# Patient Record
Sex: FEMALE | Race: WHITE | Employment: OTHER | URBAN - METROPOLITAN AREA
[De-identification: names, ages, dates, MRNs, and addresses within clinical notes are randomized per-mention and may not be internally consistent; named-entity substitution may affect disease eponyms.]

---

## 2017-01-31 ENCOUNTER — OFFICE VISIT (OUTPATIENT)
Dept: GYNECOLOGY | Age: 62
End: 2017-01-31

## 2017-01-31 VITALS
DIASTOLIC BLOOD PRESSURE: 74 MMHG | WEIGHT: 185.8 LBS | HEIGHT: 62 IN | BODY MASS INDEX: 34.19 KG/M2 | SYSTOLIC BLOOD PRESSURE: 104 MMHG | HEART RATE: 70 BPM

## 2017-01-31 DIAGNOSIS — Z01.419 ENCOUNTER FOR GYNECOLOGICAL EXAMINATION: Primary | ICD-10-CM

## 2017-01-31 DIAGNOSIS — Z85.43 HISTORY OF OVARIAN CANCER: ICD-10-CM

## 2017-01-31 RX ORDER — METOPROLOL SUCCINATE 25 MG/1
TABLET, EXTENDED RELEASE ORAL DAILY
COMMUNITY

## 2017-01-31 NOTE — PROGRESS NOTES
[address] [facility] [address], [address] [facility]  [phone]  MD Araceli Corcoran MD    Patient ID:  Shea Apgar  [id number]  1955/61 y.o. Visit date: 2017    INTERVAL HISTORY: Shea Apgar is a  female with a history of ovarian cancer. Last Cytology: 2014   Negative  Imaging history: Mammogram current  Chemotherapy history: None    She is being seen today for routine followup. Active, no restrictions. Negative  and GI review. Negative cardiopulmonary review. Patient denies any abnormal bleeding or vaginal discharge. Weight stable. Has stopped using Estrace cream  Continued weight gain    OB/GYN ROS: Denies, dysuria, hematuria, urinary incontinence, vaginal discharge, abnormal vaginal bleeding, pelvic pain    Past Medical History   Diagnosis Date    Cancer [facility])      ovarian cancer       Past Surgical History   Procedure Laterality Date    Hx cholecystectomy      Hx gyn       hysterectomy    Endoscopy, colon, diagnostic       2012       Social History     Social History    Marital status:      Spouse name: N/A    Number of children: N/A    Years of education: N/A     Occupational History    Not on file. Social History Main Topics    Smoking status: Never Smoker    Smokeless tobacco: Not on file    Alcohol use No    Drug use: No    Sexual activity: Not on file     Other Topics Concern    Not on file     Social History Narrative       History reviewed. No pertinent family history. No current outpatient prescriptions on file prior to visit. No current facility-administered medications on file prior to visit.         Allergies   Allergen Reactions    Darvocet A500 [Propoxyphene N-Acetaminophen] Unknown (comments)    Demerol [Meperidine] Unknown (comments)       ROS:  Negative except periodic pelvic cramping attributed to diverticular disease (rare)    OBJECTIVE:  PHYSICAL EXAM  VITAL SIGNS: Visit Vitals    /74 (BP 1 Location: Left arm, BP Patient Position: Sitting)    Pulse 70    Ht 5' 2\" (1.575 m)    Wt 185 lb 12.8 oz (84.3 kg)    BMI 33.98 kg/m2      GENERAL TIERRA: in no apparent distress, well developed    HEENT: within normal limits, no JVD   RESPIRATORY: lungs clear to auscultation,    CARDIOVASC: Regular rate and rhythm   GASTROINT: soft, non-tender, without masses or organomegaly   MUSCULOSKEL: no joint tenderness, deformity or swelling   INTEGUMENT:    EXTREMITIES: extremities normal, atraumatic, no cyanosis or edema   PELVIC: External genitalia: normal general appearance, BUS negative. Vaginal: normal mucosa without prolapse or lesions, normal without tenderness, induration or masses and normal rugae. Cytology taken  Adnexa: normal bimanual exam and non palpable, PSW clear, limited   RECTAL: rectal exam not indicated   ILANA SURVEY: Cervical, supraclavicular, and axillary nodes normal.   NEURO: Grossly normal     DATE REVIEW as available:  No results found for: WBC  No results found for: NA    IMPRESSION AND PLAN:    Jose Alberto Smart has a working diagnosis of ovarian cancer, KYLE  Normal annual examination    Cytology taken without friability  Return annually pending normal cytology or PRN.       Todd Remy MD  1/31/2017/12:43 PM

## 2017-02-06 LAB
CYTOLOGIST CVX/VAG CYTO: NORMAL
DX ICD CODE: NORMAL
Lab: NORMAL
Lab: NORMAL
OTHER STN SPEC: NORMAL
PATH REPORT.FINAL DX SPEC: NORMAL
STAT OF ADQ CVX/VAG CYTO-IMP: NORMAL

## 2017-02-16 NOTE — PROGRESS NOTES
Patient:   Malaika Khoury  SSN: [id number]  : 1955    Date:    2017    Ms. Trygve Cabot PKGGIYBY'A cytology/Pap smear has been interpreted as within normal limts. I would ask that subsequent Pap smears be performed at the interval discussed at the last office visit.     If there are any questions please do not hesitate to contact our offices ([phone]    Tyson Buck MD

## 2018-02-06 ENCOUNTER — OFFICE VISIT (OUTPATIENT)
Dept: GYNECOLOGY | Age: 63
End: 2018-02-06

## 2018-02-06 VITALS
SYSTOLIC BLOOD PRESSURE: 93 MMHG | HEART RATE: 71 BPM | DIASTOLIC BLOOD PRESSURE: 55 MMHG | WEIGHT: 188.6 LBS | BODY MASS INDEX: 34.71 KG/M2 | HEIGHT: 62 IN

## 2018-02-06 DIAGNOSIS — Z01.419 ENCOUNTER FOR GYNECOLOGICAL EXAMINATION: ICD-10-CM

## 2018-02-06 DIAGNOSIS — Z85.43 HISTORY OF OVARIAN CANCER: Primary | ICD-10-CM

## 2018-02-06 NOTE — PROGRESS NOTES
One year check up, pt reports no abnormal spotting or bleeding, pt states she has no questions or concerns for today's visit

## 2018-02-06 NOTE — PROGRESS NOTES
[address] [place] [address] [address], [address]  [phone]  MD Akila Payton MD    Patient ID:  Almaz Ye  [id number]  1955/62 y.o. Visit date: 2/6/2018    INTERVAL HISTORY: Almaz Ye is a  female with a history of ovarian cancer. Last Cytology: 1/2017,  9/19/2014   Negative  Imaging history: Mammogram current  Chemotherapy history: None    2/6/2018:  She is being seen today for routine followup. Active, no restrictions. Negative  and GI review. Negative cardiopulmonary review. Patient denies any abnormal bleeding or vaginal discharge. Weight stable. Has stopped using Estrace cream  Continued weight gain    OB/GYN ROS: Denies, dysuria, hematuria, urinary incontinence, vaginal discharge, abnormal vaginal bleeding, pelvic pain    Past Medical History:   Diagnosis Date    Cancer [facility])     ovarian cancer       Past Surgical History:   Procedure Laterality Date    ENDOSCOPY, COLON, DIAGNOSTIC      march 2012    HX CHOLECYSTECTOMY      HX GYN      hysterectomy       Social History     Social History    Marital status:      Spouse name: N/A    Number of children: N/A    Years of education: N/A     Occupational History    Not on file. Social History Main Topics    Smoking status: Never Smoker    Smokeless tobacco: Never Used    Alcohol use No    Drug use: No    Sexual activity: Not on file     Other Topics Concern    Not on file     Social History Narrative       History reviewed. No pertinent family history. Current Outpatient Prescriptions on File Prior to Visit   Medication Sig Dispense Refill    metoprolol succinate (TOPROL-XL) 25 mg XL tablet Take  by mouth daily. No current facility-administered medications on file prior to visit.         Allergies   Allergen Reactions    Darvocet A500 [Propoxyphene N-Acetaminophen] Unknown (comments)    Demerol [Meperidine] Unknown (comments)       ROS:  Negative except periodic pelvic cramping attributed to diverticular disease (rare)    OBJECTIVE:  PHYSICAL EXAM  VITAL SIGNS: Visit Vitals    BP 93/55 (BP 1 Location: Left arm, BP Patient Position: Sitting)    Pulse 71    Ht 5' 2\" (1.575 m)    Wt 188 lb 9.6 oz (85.5 kg)    BMI 34.5 kg/m2      GENERAL TIERRA: in no apparent distress, well developed    HEENT: within normal limits, no JVD   RESPIRATORY: lungs clear to auscultation,    CARDIOVASC: Regular rate and rhythm   GASTROINT: soft, non-tender, without masses or organomegaly   MUSCULOSKEL: no joint tenderness, deformity or swelling   INTEGUMENT:    EXTREMITIES: extremities normal, atraumatic, no cyanosis or edema   PELVIC: External genitalia: normal general appearance, BUS negative. Vaginal: normal mucosa without prolapse or lesions, normal without tenderness, induration or masses and normal rugae. Cytology taken  Adnexa: normal bimanual exam and non palpable, PSW clear, limited   RECTAL: rectal exam not indicated   ILANA SURVEY: Cervical, supraclavicular, and axillary nodes normal.   NEURO: Grossly normal     DATE REVIEW as available:  No results found for: WBC  No results found for: NA    IMPRESSION AND PLAN:    Fran Wynn has a working diagnosis of ovarian cancer, KYLE  Normal annual examination    Cytology taken without friability  Return annually pending normal cytology or PRN.       Ramiro Reece MD  2/6/2018/12:43 PM

## 2018-02-08 LAB
CYTOLOGIST CVX/VAG CYTO: NORMAL
DX ICD CODE: NORMAL
Lab: NORMAL
OTHER STN SPEC: NORMAL
PATH REPORT.FINAL DX SPEC: NORMAL
STAT OF ADQ CVX/VAG CYTO-IMP: NORMAL

## 2018-02-18 NOTE — PROGRESS NOTES
Patient:   Eitan Weaver  SSN: [id number]  : 1955    Date:    2018    Ms. Kamilla Salinas DQJKSERB'T cytology/Pap smear has been interpreted as within normal limts. I would ask that subsequent Pap smears be performed at the interval discussed at the last office visit.     If there are any questions please do not hesitate to contact our offices [phone] Wayne Cruz MD

## 2019-02-14 ENCOUNTER — OFFICE VISIT (OUTPATIENT)
Dept: GYNECOLOGY | Age: 64
End: 2019-02-14

## 2019-02-14 VITALS
HEART RATE: 66 BPM | BODY MASS INDEX: 34.96 KG/M2 | SYSTOLIC BLOOD PRESSURE: 139 MMHG | WEIGHT: 190 LBS | DIASTOLIC BLOOD PRESSURE: 74 MMHG | HEIGHT: 62 IN

## 2019-02-14 DIAGNOSIS — Z85.43 HISTORY OF OVARIAN CANCER: Primary | ICD-10-CM

## 2019-02-14 NOTE — PROGRESS NOTES
[address] [facility] [address], [address]   [phone]  F [phone]    Office Note  Patient ID:  Name:  Landon Francois  MRN:  [id number]  :  1955/63 y.o. Date:  2019      HISTORY OF PRESENT ILLNESS:  Landon Francois is a 58 y.o.  postmenopausal female with a history of ovarian cancer. She is a former patient of Dr. Emma Pace and was last seen by him in 2018. She was diagnosed in . She was stage I and did not require any chemotherapy after surgery. She presents today for surveillance. She is doing well and is without complaints. She denies any pelvic or abdominal pain or changes in her bowel or bladder habits. ROS:   and GI review:  Negative  Cardiopulmonary review:  Negative   Musculoskeletal:  Negative    A comprehensive review of systems was negative except for that written in the History of Present Illness. , 10 point ROS        Problem List:  Patient Active Problem List    Diagnosis Date Noted    History of ovarian cancer 2013    Obesity 2013     PMH:  Past Medical History:   Diagnosis Date    Cancer ([facility] [address]     ovarian cancer      PSH:  Past Surgical History:   Procedure Laterality Date    ENDOSCOPY, COLON, DIAGNOSTIC      2012    HX CHOLECYSTECTOMY      HX GYN      hysterectomy      Social History:  Social History     Tobacco Use    Smoking status: Never Smoker    Smokeless tobacco: Never Used   Substance Use Topics    Alcohol use: No      Family History:  History reviewed. No pertinent family history. Medications: (reviewed)  Current Outpatient Medications   Medication Sig    metoprolol succinate (TOPROL-XL) 25 mg XL tablet Take  by mouth daily. No current facility-administered medications for this visit.       Allergies: (reviewed)  Allergies   Allergen Reactions    Darvocet A500 [Propoxyphene N-Acetaminophen] Unknown (comments)    Demerol [Meperidine] Unknown (comments) OBJECTIVE:    Physical Exam:  VITAL SIGNS: Vitals:    02/14/19 1012   Height: 5' 2.01\" (1.575 m)     Body mass index is 34.49 kg/m². GENERAL TIERRA: Conversant, alert, oriented. No acute distress. HEENT: HEENT. No thyroid enlargement. No JVD. Neck: Supple without restrictions. RESPIRATORY: Clear to auscultation and percussion to the bases. No CVAT. CARDIOVASC: RRR without murmur/rub. GASTROINT: soft, non-tender, without masses or organomegaly   MUSCULOSKEL: no joint tenderness, deformity or swelling   EXTREMITIES: extremities normal, atraumatic, no cyanosis or edema   PELVIC: External genitalia: normal general appearance  Urinary system: urethral meatus normal  Vaginal: normal without tenderness, induration or masses  Cervix: absent  Adnexa: removed surgically  Uterus: absent   RECTAL: Deferred   ILANA SURVEY: No suspicious lymphadenopathy or edema noted. NEURO: Grossly intact. No acute deficit. IMPRESSION/PLAN:  Jimmy Serrano is a 58 y.o. female with a diagnosis of ovarian cancer. She has no evidence of disease at this time. We will have her back in 1-2 years for follow-up. I encouraged her to keep up to date on her MMG and colonoscopy.         Signed By: Claudeen Lute, MD     2/14/2019/10:13 AM

## 2022-03-16 ENCOUNTER — TRANSCRIBE ORDER (OUTPATIENT)
Dept: SCHEDULING | Age: 67
End: 2022-03-16

## 2022-03-16 DIAGNOSIS — R91.1 COIN LESION: Primary | ICD-10-CM

## 2022-08-16 DIAGNOSIS — R91.1 COIN LESION: ICD-10-CM

## 2022-08-17 ENCOUNTER — HOSPITAL ENCOUNTER (OUTPATIENT)
Dept: CT IMAGING | Age: 67
Discharge: HOME | End: 2022-08-17
Attending: INTERNAL MEDICINE
Payer: MEDICARE

## 2022-08-17 PROCEDURE — 71250 CT THORAX DX C-: CPT

## 2025-01-21 ENCOUNTER — OFFICE VISIT (OUTPATIENT)
Age: 70
End: 2025-01-21
Payer: MEDICARE

## 2025-01-21 VITALS
HEIGHT: 61 IN | BODY MASS INDEX: 25.57 KG/M2 | OXYGEN SATURATION: 99 % | HEART RATE: 79 BPM | WEIGHT: 135.4 LBS | SYSTOLIC BLOOD PRESSURE: 126 MMHG | DIASTOLIC BLOOD PRESSURE: 60 MMHG | TEMPERATURE: 97.5 F | RESPIRATION RATE: 18 BRPM

## 2025-01-21 DIAGNOSIS — G25.0 BENIGN ESSENTIAL TREMOR SYNDROME: Primary | ICD-10-CM

## 2025-01-21 DIAGNOSIS — G25.3 MYOCLONIC JERKING: ICD-10-CM

## 2025-01-21 DIAGNOSIS — R41.3 MEMORY LOSS: ICD-10-CM

## 2025-01-21 DIAGNOSIS — E53.8 B12 DEFICIENCY: ICD-10-CM

## 2025-01-21 PROCEDURE — 1159F MED LIST DOCD IN RCRD: CPT | Performed by: PSYCHIATRY & NEUROLOGY

## 2025-01-21 PROCEDURE — 99205 OFFICE O/P NEW HI 60 MIN: CPT | Performed by: PSYCHIATRY & NEUROLOGY

## 2025-01-21 PROCEDURE — 3017F COLORECTAL CA SCREEN DOC REV: CPT | Performed by: PSYCHIATRY & NEUROLOGY

## 2025-01-21 PROCEDURE — G8427 DOCREV CUR MEDS BY ELIG CLIN: HCPCS | Performed by: PSYCHIATRY & NEUROLOGY

## 2025-01-21 PROCEDURE — 1160F RVW MEDS BY RX/DR IN RCRD: CPT | Performed by: PSYCHIATRY & NEUROLOGY

## 2025-01-21 PROCEDURE — 1126F AMNT PAIN NOTED NONE PRSNT: CPT | Performed by: PSYCHIATRY & NEUROLOGY

## 2025-01-21 PROCEDURE — G8400 PT W/DXA NO RESULTS DOC: HCPCS | Performed by: PSYCHIATRY & NEUROLOGY

## 2025-01-21 PROCEDURE — 1090F PRES/ABSN URINE INCON ASSESS: CPT | Performed by: PSYCHIATRY & NEUROLOGY

## 2025-01-21 PROCEDURE — 1036F TOBACCO NON-USER: CPT | Performed by: PSYCHIATRY & NEUROLOGY

## 2025-01-21 PROCEDURE — G8419 CALC BMI OUT NRM PARAM NOF/U: HCPCS | Performed by: PSYCHIATRY & NEUROLOGY

## 2025-01-21 PROCEDURE — 1123F ACP DISCUSS/DSCN MKR DOCD: CPT | Performed by: PSYCHIATRY & NEUROLOGY

## 2025-01-21 RX ORDER — FLAXSEED OIL 1000 MG
CAPSULE ORAL
COMMUNITY

## 2025-01-21 ASSESSMENT — PATIENT HEALTH QUESTIONNAIRE - PHQ9
SUM OF ALL RESPONSES TO PHQ QUESTIONS 1-9: 0
SUM OF ALL RESPONSES TO PHQ QUESTIONS 1-9: 0
1. LITTLE INTEREST OR PLEASURE IN DOING THINGS: NOT AT ALL
SUM OF ALL RESPONSES TO PHQ QUESTIONS 1-9: 0
SUM OF ALL RESPONSES TO PHQ QUESTIONS 1-9: 0
SUM OF ALL RESPONSES TO PHQ9 QUESTIONS 1 & 2: 0
2. FEELING DOWN, DEPRESSED OR HOPELESS: NOT AT ALL

## 2025-01-21 NOTE — PROGRESS NOTES
Consult  REFERRED BY:  No primary care provider on file.    CHIEF COMPLAINT: Patient seen on urgent work in basis as a new patient for new problem for multiple neurologic complaints, the first being that she had a progressive tremor that began in her right hand and now has spread to her left hand and is worse when she tries to do things like cutting or working in the kitchen, and also complaining of memory loss, more recent memory, that is more bothersome to her, and also having an episode when she was half asleep and had electric shocklike sensations in the right side of her head that occurred 4 times and then she woke up, and lastly she has myoclonic jerks when she turns quickly was quickly and is concerned about that.      Subjective:     Shasha Billings is a 69 y.o. right-handed  female we are seeing at the request of her PCP for new problem of Patient seen on urgent work in basis as a new patient for new problem for multiple neurologic complaints, the first being that she had a progressive tremor that began in her right hand and now has spread to her left hand and is worse when she tries to do things like cutting or working in the kitchen, and also complaining of memory loss, more recent memory, that is more bothersome to her, and also having an episode when she was half asleep and had electric shocklike sensations in the right side of her head that occurred 4 times and then she woke up, and lastly she has myoclonic jerks when she turns quickly was quickly and is concerned about that.  Patient gives a history that her mother had dementia, and her father had ALS, but she has no other family history of memory loss.  Her  had noticed some problem but other people have not complained much about it.  The patient is concerned about it because of her mother's history.  The patient was taking Inderal till May of last year and went off the medication because she just does not like taking medicines  Her

## 2025-01-22 DIAGNOSIS — R41.3 MEMORY LOSS: ICD-10-CM

## 2025-01-22 DIAGNOSIS — G25.3 MYOCLONIC JERKING: ICD-10-CM

## 2025-01-22 DIAGNOSIS — G25.0 BENIGN ESSENTIAL TREMOR SYNDROME: ICD-10-CM

## 2025-01-22 DIAGNOSIS — E53.8 B12 DEFICIENCY: ICD-10-CM

## 2025-01-23 LAB
FOLATE SERPL-MCNC: 13 NG/ML (ref 5–21)
VIT B12 SERPL-MCNC: 485 PG/ML (ref 193–986)

## 2025-01-24 ENCOUNTER — TELEPHONE (OUTPATIENT)
Age: 70
End: 2025-01-24

## 2025-01-24 NOTE — TELEPHONE ENCOUNTER
Patient is calling to schedule an a new patient appt with Dr. Suggs. Referral on file. Please call her back at . Thank you.

## 2025-02-02 ENCOUNTER — CLINICAL DOCUMENTATION (OUTPATIENT)
Age: 70
End: 2025-02-02

## 2025-02-02 ENCOUNTER — HOSPITAL ENCOUNTER (OUTPATIENT)
Facility: HOSPITAL | Age: 70
Discharge: HOME | End: 2025-02-05
Attending: PSYCHIATRY & NEUROLOGY
Payer: MEDICARE

## 2025-02-02 DIAGNOSIS — R41.3 MEMORY LOSS: ICD-10-CM

## 2025-02-02 DIAGNOSIS — G25.0 BENIGN ESSENTIAL TREMOR SYNDROME: ICD-10-CM

## 2025-02-02 DIAGNOSIS — G25.3 MYOCLONIC JERKING: ICD-10-CM

## 2025-02-02 PROCEDURE — 70553 MRI BRAIN STEM W/O & W/DYE: CPT

## 2025-02-02 PROCEDURE — A9579 GAD-BASE MR CONTRAST NOS,1ML: HCPCS | Performed by: PSYCHIATRY & NEUROLOGY

## 2025-02-02 PROCEDURE — 6360000004 HC RX CONTRAST MEDICATION: Performed by: PSYCHIATRY & NEUROLOGY

## 2025-02-02 RX ADMIN — GADOTERIDOL 12 ML: 279.3 INJECTION, SOLUTION INTRAVENOUS at 13:16

## 2025-02-27 ENCOUNTER — HOSPITAL ENCOUNTER (OUTPATIENT)
Facility: HOSPITAL | Age: 70
Discharge: HOME | End: 2025-02-27
Attending: PSYCHIATRY & NEUROLOGY
Payer: MEDICARE

## 2025-02-27 DIAGNOSIS — G25.3 MYOCLONIC JERKING: ICD-10-CM

## 2025-02-27 DIAGNOSIS — R41.3 MEMORY LOSS: ICD-10-CM

## 2025-02-27 DIAGNOSIS — G25.0 BENIGN ESSENTIAL TREMOR SYNDROME: ICD-10-CM

## 2025-02-27 PROCEDURE — 95714 VEEG EA 12-26 HR UNMNTR: CPT

## 2025-02-27 PROCEDURE — 95700 EEG CONT REC W/VID EEG TECH: CPT

## 2025-02-27 PROCEDURE — 95719 EEG PHYS/QHP EA INCR W/O VID: CPT | Performed by: PSYCHIATRY & NEUROLOGY

## 2025-02-28 PROBLEM — R41.82 ACUTE ALTERATION IN MENTAL STATUS: Status: ACTIVE | Noted: 2025-02-28

## 2025-02-28 PROBLEM — R56.9 CONVULSIONS (HCC): Status: ACTIVE | Noted: 2025-02-28

## 2025-03-01 NOTE — PROCEDURES
[facility]              [address] [id number]                                   EEG      PATIENT NAME: MABEL WEN             : 1955  MED REC NO: [id number]                       ROOM:   ACCOUNT NO: [id number]                       ADMIT DATE: 2025  PROVIDER: Quique Figueroa MD    DATE OF SERVICE:  2025    REFERRING PHYSICIAN:  Quique Figueroa MD    DATE OF STUDY:  2025 to 2025.    CLINICAL INDICATION:  The patient with history of myoclonic jerking and acute altered mental status.  EEG to rule out seizures, rule out partial complex seizures, rule out myoclonic seizures.    EEG CLASSIFICATION:  The patient had a normal ambulatory 24-hour EEG recording.    DESCRIPTION OF THE RECORD:  This is a 16-channel EEG recording on the patient to evaluate for possible seizures.  This study began on 09:53 a.m. on 2025 and ended at 10:08 a.m. on 2025, for a total time of study of 24 hours and 15 minutes.  During this time, the patient had no clear areas of focal slowing, no clear spike or spike-and-wave discharges.  No recorded electrographic or dysrhythmic spells of any type seen.  The patient did have some movement muscle and electrode artifact in the recording.  The study was technically adequate and interpretable.  The patient did enter a prolonged state of sleep in the evening hours with K complexes and sleep spindles seen in the central head regions.  Hyperventilation was not performed.  Photic stimulation was not performed.  At the time of this dictation, the patient's clinical diary has not been returned.    INTERPRETATION:  This is a normal electroencephalogram with the patient awake and asleep showing no clear areas of focal slowing, no clear spike or spike-and-wave discharges.  No recorded electrographic or dysrhythmic spells of any type seen.  Clinical correlation recommended.        QUIQUE FIGUEROA      ERAS BAG GIVEN No  ERAS INSTRUCTIONS EXPLAINED No    ASSIST: Dr. Peggy Barakat    H&P TO BE COMPLETED BY:   Surgeon  FOR H&P TO BE DONE BY SURGEON   UPDATE VISIT ON DATE AT HOSPITAL  SAME DAY/OBSERVATION/INPATIENT: STRAIGHT SAME DAY  EQUIPMENT: Bubbleball  VENDOR NEEDED AT CASE:   IF IUD WHAT BRAND   LABS/SPECIAL INSTRUCTIONS:   TIME OFF WORK: 2 days  ESTIMATED DOCTOR TIME NEEDED IN MINUTES 60  POST OP TO BE SCHEDULED AT 4 WEEKS AFTER SX APPOINTMENT LENGTH IN MINUTES 15       MD SHOEMAKER/YANE  D:  02/28/2025 19:30:29  T:  02/28/2025 20:43:52  JOB #:  [id number]    CC:   Quique Figueroa MD

## 2025-04-24 ENCOUNTER — OFFICE VISIT (OUTPATIENT)
Age: 70
End: 2025-04-24
Payer: MEDICARE

## 2025-04-24 VITALS
DIASTOLIC BLOOD PRESSURE: 80 MMHG | SYSTOLIC BLOOD PRESSURE: 122 MMHG | RESPIRATION RATE: 17 BRPM | OXYGEN SATURATION: 100 % | HEART RATE: 77 BPM | WEIGHT: 124.8 LBS | HEIGHT: 61 IN | BODY MASS INDEX: 23.56 KG/M2 | TEMPERATURE: 97.5 F

## 2025-04-24 DIAGNOSIS — R41.82 ACUTE ALTERATION IN MENTAL STATUS: ICD-10-CM

## 2025-04-24 DIAGNOSIS — G25.0 BENIGN ESSENTIAL TREMOR SYNDROME: Primary | ICD-10-CM

## 2025-04-24 DIAGNOSIS — E16.2 HYPOGLYCEMIA: ICD-10-CM

## 2025-04-24 DIAGNOSIS — G25.3 MYOCLONIC JERKING: ICD-10-CM

## 2025-04-24 DIAGNOSIS — R41.3 MEMORY LOSS: ICD-10-CM

## 2025-04-24 PROCEDURE — G8400 PT W/DXA NO RESULTS DOC: HCPCS | Performed by: PSYCHIATRY & NEUROLOGY

## 2025-04-24 PROCEDURE — G8427 DOCREV CUR MEDS BY ELIG CLIN: HCPCS | Performed by: PSYCHIATRY & NEUROLOGY

## 2025-04-24 PROCEDURE — 99214 OFFICE O/P EST MOD 30 MIN: CPT | Performed by: PSYCHIATRY & NEUROLOGY

## 2025-04-24 PROCEDURE — 1036F TOBACCO NON-USER: CPT | Performed by: PSYCHIATRY & NEUROLOGY

## 2025-04-24 PROCEDURE — 1159F MED LIST DOCD IN RCRD: CPT | Performed by: PSYCHIATRY & NEUROLOGY

## 2025-04-24 PROCEDURE — 1126F AMNT PAIN NOTED NONE PRSNT: CPT | Performed by: PSYCHIATRY & NEUROLOGY

## 2025-04-24 PROCEDURE — 3017F COLORECTAL CA SCREEN DOC REV: CPT | Performed by: PSYCHIATRY & NEUROLOGY

## 2025-04-24 PROCEDURE — G8420 CALC BMI NORM PARAMETERS: HCPCS | Performed by: PSYCHIATRY & NEUROLOGY

## 2025-04-24 PROCEDURE — 1090F PRES/ABSN URINE INCON ASSESS: CPT | Performed by: PSYCHIATRY & NEUROLOGY

## 2025-04-24 PROCEDURE — 1160F RVW MEDS BY RX/DR IN RCRD: CPT | Performed by: PSYCHIATRY & NEUROLOGY

## 2025-04-24 PROCEDURE — 1123F ACP DISCUSS/DSCN MKR DOCD: CPT | Performed by: PSYCHIATRY & NEUROLOGY

## 2025-04-24 RX ORDER — IODINE SOLUTION STRONG 5% (LUGOL'S) 5 %
0.2 SOLUTION ORAL 3 TIMES DAILY
COMMUNITY

## 2025-04-24 ASSESSMENT — PATIENT HEALTH QUESTIONNAIRE - PHQ9
1. LITTLE INTEREST OR PLEASURE IN DOING THINGS: NOT AT ALL
2. FEELING DOWN, DEPRESSED OR HOPELESS: NOT AT ALL
SUM OF ALL RESPONSES TO PHQ QUESTIONS 1-9: 0

## 2025-07-14 NOTE — PROGRESS NOTES
Intake Note      Patient Name: Shasha Billings  YOB: 1955    Age: 69 y.o.  Date of Intake: 7/15/2025   Education: 12 Ethnicity White   Gender: Female Referring Provider: Dr. Figueroa     REASON FOR REFERRAL AND EVALUATION PROCEDURES:  Shasha Billings  was referred for evaluation by her neurologist to assist in differential diagnosis and individualized treatment planning. she understood the rationale and procedures for evaluation, as well as the limits to confidentiality, and agreed to participate. she consented to have this report made available to her  treating providers through her  electronic medical records.   History Sources: Patient and Medical Record    HISTORY OF PRESENT ILLNESS:  The patient is a 69-year-old female with pertinent medical history noted for benign essential tremor syndrome, myoclonic jerking, memory loss, B12 deficiency, acute alteration in mental status, convulsions, and hypoglycemia.  She presented independently for clinical interview and appeared to be an adequate historian with appropriate insight.  The patient reported multiple concerns related to physical and cognitive functioning.  Per the patient's report, approximately 1 year ago she discontinued propranolol and after doing so, noticed the emergence of a right upper extremity action tremor that progressed to affect the left upper extremity within a few weeks.  This tremor primarily occurs with movement but the patient stated it does not affect her handwriting.  She also reported has not been worsening over the past year.  The patient also mentioned she started experiencing bilateral upper extremity jerking movements approximately 1 year ago.  She stated this can occur if she is \"anxious about something or in a hurry\" but she also indicated it occurs when she turns on the light for a bathroom or when she is making quick movements in the kitchen.  She reported it is a daily occurrence that happens multiple times per day.

## 2025-07-15 ENCOUNTER — OFFICE VISIT (OUTPATIENT)
Age: 70
End: 2025-07-15
Payer: MEDICARE

## 2025-07-15 DIAGNOSIS — R41.82 ACUTE ALTERATION IN MENTAL STATUS: ICD-10-CM

## 2025-07-15 DIAGNOSIS — R41.3 MEMORY LOSS: ICD-10-CM

## 2025-07-15 DIAGNOSIS — G25.0 BENIGN ESSENTIAL TREMOR SYNDROME: Primary | ICD-10-CM

## 2025-07-15 PROCEDURE — 90791 PSYCH DIAGNOSTIC EVALUATION: CPT | Performed by: CLINICAL NEUROPSYCHOLOGIST

## 2025-07-22 ENCOUNTER — PROCEDURE VISIT (OUTPATIENT)
Age: 70
End: 2025-07-22
Payer: MEDICARE

## 2025-07-22 DIAGNOSIS — G25.0 BENIGN ESSENTIAL TREMOR SYNDROME: Primary | ICD-10-CM

## 2025-07-22 DIAGNOSIS — G31.84 MILD NEUROCOGNITIVE DISORDER: ICD-10-CM

## 2025-07-22 PROCEDURE — 96139 PSYCL/NRPSYC TST TECH EA: CPT | Performed by: CLINICAL NEUROPSYCHOLOGIST

## 2025-07-22 PROCEDURE — 96132 NRPSYC TST EVAL PHYS/QHP 1ST: CPT | Performed by: CLINICAL NEUROPSYCHOLOGIST

## 2025-07-22 PROCEDURE — 96133 NRPSYC TST EVAL PHYS/QHP EA: CPT | Performed by: CLINICAL NEUROPSYCHOLOGIST

## 2025-07-22 PROCEDURE — 96138 PSYCL/NRPSYC TECH 1ST: CPT | Performed by: CLINICAL NEUROPSYCHOLOGIST

## 2025-08-05 ENCOUNTER — OFFICE VISIT (OUTPATIENT)
Age: 70
End: 2025-08-05
Payer: MEDICARE

## 2025-08-05 ENCOUNTER — CLINICAL DOCUMENTATION (OUTPATIENT)
Age: 70
End: 2025-08-05

## 2025-08-05 DIAGNOSIS — G31.84 MILD NEUROCOGNITIVE DISORDER: Primary | ICD-10-CM

## 2025-08-05 DIAGNOSIS — G25.0 BENIGN ESSENTIAL TREMOR SYNDROME: ICD-10-CM

## 2025-08-05 PROCEDURE — 96132 NRPSYC TST EVAL PHYS/QHP 1ST: CPT | Performed by: CLINICAL NEUROPSYCHOLOGIST

## 2025-08-19 ENCOUNTER — OFFICE VISIT (OUTPATIENT)
Age: 70
End: 2025-08-19
Payer: MEDICARE

## 2025-08-19 VITALS
DIASTOLIC BLOOD PRESSURE: 70 MMHG | OXYGEN SATURATION: 100 % | RESPIRATION RATE: 15 BRPM | HEART RATE: 71 BPM | HEIGHT: 61 IN | SYSTOLIC BLOOD PRESSURE: 134 MMHG | BODY MASS INDEX: 23 KG/M2 | WEIGHT: 121.8 LBS | TEMPERATURE: 97.7 F

## 2025-08-19 DIAGNOSIS — G25.0 BENIGN ESSENTIAL TREMOR SYNDROME: Primary | ICD-10-CM

## 2025-08-19 DIAGNOSIS — G31.84 MCI (MILD COGNITIVE IMPAIRMENT): ICD-10-CM

## 2025-08-19 DIAGNOSIS — R41.82 ACUTE ALTERATION IN MENTAL STATUS: ICD-10-CM

## 2025-08-19 DIAGNOSIS — G44.86 CERVICOGENIC HEADACHE: ICD-10-CM

## 2025-08-19 DIAGNOSIS — M47.22 CERVICAL RADICULOPATHY DUE TO DEGENERATIVE JOINT DISEASE OF SPINE: ICD-10-CM

## 2025-08-19 DIAGNOSIS — G25.3 MYOCLONIC JERKING: ICD-10-CM

## 2025-08-19 DIAGNOSIS — R56.9 CONVULSIONS, UNSPECIFIED CONVULSION TYPE (HCC): ICD-10-CM

## 2025-08-19 PROCEDURE — 1126F AMNT PAIN NOTED NONE PRSNT: CPT | Performed by: PSYCHIATRY & NEUROLOGY

## 2025-08-19 PROCEDURE — 1036F TOBACCO NON-USER: CPT | Performed by: PSYCHIATRY & NEUROLOGY

## 2025-08-19 PROCEDURE — G8427 DOCREV CUR MEDS BY ELIG CLIN: HCPCS | Performed by: PSYCHIATRY & NEUROLOGY

## 2025-08-19 PROCEDURE — 99214 OFFICE O/P EST MOD 30 MIN: CPT | Performed by: PSYCHIATRY & NEUROLOGY

## 2025-08-19 PROCEDURE — 1090F PRES/ABSN URINE INCON ASSESS: CPT | Performed by: PSYCHIATRY & NEUROLOGY

## 2025-08-19 PROCEDURE — 1123F ACP DISCUSS/DSCN MKR DOCD: CPT | Performed by: PSYCHIATRY & NEUROLOGY

## 2025-08-19 PROCEDURE — G8400 PT W/DXA NO RESULTS DOC: HCPCS | Performed by: PSYCHIATRY & NEUROLOGY

## 2025-08-19 PROCEDURE — 1159F MED LIST DOCD IN RCRD: CPT | Performed by: PSYCHIATRY & NEUROLOGY

## 2025-08-19 PROCEDURE — 1160F RVW MEDS BY RX/DR IN RCRD: CPT | Performed by: PSYCHIATRY & NEUROLOGY

## 2025-08-19 PROCEDURE — 3017F COLORECTAL CA SCREEN DOC REV: CPT | Performed by: PSYCHIATRY & NEUROLOGY

## 2025-08-19 PROCEDURE — G8420 CALC BMI NORM PARAMETERS: HCPCS | Performed by: PSYCHIATRY & NEUROLOGY

## 2025-08-19 RX ORDER — LAMOTRIGINE 25 MG/1
25 TABLET ORAL 2 TIMES DAILY
Qty: 60 TABLET | Refills: 11 | Status: SHIPPED | OUTPATIENT
Start: 2025-08-19

## 2025-08-19 RX ORDER — ERGOCALCIFEROL 1.25 MG/1
50000 CAPSULE, LIQUID FILLED ORAL WEEKLY
COMMUNITY

## 2025-08-19 ASSESSMENT — PATIENT HEALTH QUESTIONNAIRE - PHQ9
SUM OF ALL RESPONSES TO PHQ QUESTIONS 1-9: 0
2. FEELING DOWN, DEPRESSED OR HOPELESS: NOT AT ALL
SUM OF ALL RESPONSES TO PHQ QUESTIONS 1-9: 0
1. LITTLE INTEREST OR PLEASURE IN DOING THINGS: NOT AT ALL